# Patient Record
Sex: MALE | Race: OTHER | NOT HISPANIC OR LATINO | Employment: UNEMPLOYED | URBAN - METROPOLITAN AREA
[De-identification: names, ages, dates, MRNs, and addresses within clinical notes are randomized per-mention and may not be internally consistent; named-entity substitution may affect disease eponyms.]

---

## 2022-08-11 ENCOUNTER — TELEPHONE (OUTPATIENT)
Dept: GASTROENTEROLOGY | Facility: CLINIC | Age: 82
End: 2022-08-11
Payer: MEDICARE

## 2022-08-11 NOTE — TELEPHONE ENCOUNTER
----- Message from Shanta Garcia MA sent at 8/11/2022  7:58 AM CDT -----  Contact: [phone]    ----- Message -----  From: Sandy Lawrence  Sent: 8/10/2022   3:18 PM CDT  To: [facility] Clinical Support Staff        Who Called: [facility]    Does the patient know what this is regarding?: need pt next appt date for labs//   Would the patient rather a call back or a response via [facility]?  Callback   Best Call Back Number: [phone]   Additional Information:         [phone] fax #